# Patient Record
Sex: FEMALE | Race: WHITE | NOT HISPANIC OR LATINO | Employment: UNEMPLOYED | URBAN - METROPOLITAN AREA
[De-identification: names, ages, dates, MRNs, and addresses within clinical notes are randomized per-mention and may not be internally consistent; named-entity substitution may affect disease eponyms.]

---

## 2018-11-03 ENCOUNTER — HOSPITAL ENCOUNTER (OUTPATIENT)
Dept: LAB | Facility: MEDICAL CENTER | Age: 38
End: 2018-11-03
Attending: NURSE PRACTITIONER
Payer: MEDICAID

## 2018-11-03 LAB
APPEARANCE UR: CLEAR
BILIRUB UR QL STRIP.AUTO: NEGATIVE
COLOR UR: YELLOW
FSH SERPL-ACNC: 3.4 MIU/ML
GLUCOSE UR STRIP.AUTO-MCNC: NEGATIVE MG/DL
HIV 1+2 AB+HIV1 P24 AG SERPL QL IA: NON REACTIVE
KETONES UR STRIP.AUTO-MCNC: NEGATIVE MG/DL
LEUKOCYTE ESTERASE UR QL STRIP.AUTO: NEGATIVE
LH SERPL-ACNC: 1 IU/L
MICRO URNS: NORMAL
NITRITE UR QL STRIP.AUTO: NEGATIVE
PH UR STRIP.AUTO: 6.5 [PH]
PROLACTIN SERPL-MCNC: 9.47 NG/ML (ref 2.8–26)
PROT UR QL STRIP: NEGATIVE MG/DL
RBC UR QL AUTO: NEGATIVE
SP GR UR STRIP.AUTO: 1.02
T4 FREE SERPL-MCNC: 1.02 NG/DL (ref 0.53–1.43)
TREPONEMA PALLIDUM IGG+IGM AB [PRESENCE] IN SERUM OR PLASMA BY IMMUNOASSAY: NON REACTIVE
TSH SERPL DL<=0.005 MIU/L-ACNC: 1.77 UIU/ML (ref 0.38–5.33)
UROBILINOGEN UR STRIP.AUTO-MCNC: 0.2 MG/DL

## 2018-11-03 PROCEDURE — 81003 URINALYSIS AUTO W/O SCOPE: CPT

## 2018-11-03 PROCEDURE — 84443 ASSAY THYROID STIM HORMONE: CPT

## 2018-11-03 PROCEDURE — 83001 ASSAY OF GONADOTROPIN (FSH): CPT

## 2018-11-03 PROCEDURE — 36415 COLL VENOUS BLD VENIPUNCTURE: CPT

## 2018-11-03 PROCEDURE — 82671 ASSAY OF ESTROGENS: CPT

## 2018-11-03 PROCEDURE — 87389 HIV-1 AG W/HIV-1&-2 AB AG IA: CPT

## 2018-11-03 PROCEDURE — 83002 ASSAY OF GONADOTROPIN (LH): CPT

## 2018-11-03 PROCEDURE — 84146 ASSAY OF PROLACTIN: CPT

## 2018-11-03 PROCEDURE — 86780 TREPONEMA PALLIDUM: CPT

## 2018-11-03 PROCEDURE — 84439 ASSAY OF FREE THYROXINE: CPT

## 2018-11-07 LAB
ESTRADIOL SERPL HS-MCNC: 165 PG/ML
ESTROGEN SERPL CALC-MCNC: 239 PG/ML
ESTRONE SERPL-MCNC: 74 PG/ML

## 2019-03-12 ENCOUNTER — HOSPITAL ENCOUNTER (OUTPATIENT)
Dept: LAB | Facility: MEDICAL CENTER | Age: 39
End: 2019-03-12
Attending: INTERNAL MEDICINE
Payer: MEDICAID

## 2019-03-12 LAB
ALBUMIN SERPL BCP-MCNC: 4.5 G/DL (ref 3.2–4.9)
ALBUMIN/GLOB SERPL: 1.9 G/DL
ALP SERPL-CCNC: 37 U/L (ref 30–99)
ALT SERPL-CCNC: 20 U/L (ref 2–50)
ANION GAP SERPL CALC-SCNC: 5 MMOL/L (ref 0–11.9)
AST SERPL-CCNC: 24 U/L (ref 12–45)
BASOPHILS # BLD AUTO: 1.7 % (ref 0–1.8)
BASOPHILS # BLD: 0.1 K/UL (ref 0–0.12)
BILIRUB SERPL-MCNC: 1.9 MG/DL (ref 0.1–1.5)
BUN SERPL-MCNC: 8 MG/DL (ref 8–22)
CALCIUM SERPL-MCNC: 9.4 MG/DL (ref 8.5–10.5)
CHLORIDE SERPL-SCNC: 109 MMOL/L (ref 96–112)
CO2 SERPL-SCNC: 25 MMOL/L (ref 20–33)
CREAT SERPL-MCNC: 0.79 MG/DL (ref 0.5–1.4)
EOSINOPHIL # BLD AUTO: 0.2 K/UL (ref 0–0.51)
EOSINOPHIL NFR BLD: 3.5 % (ref 0–6.9)
ERYTHROCYTE [DISTWIDTH] IN BLOOD BY AUTOMATED COUNT: 44.9 FL (ref 35.9–50)
GLOBULIN SER CALC-MCNC: 2.4 G/DL (ref 1.9–3.5)
GLUCOSE SERPL-MCNC: 88 MG/DL (ref 65–99)
HCT VFR BLD AUTO: 45.1 % (ref 37–47)
HGB BLD-MCNC: 14.8 G/DL (ref 12–16)
INR PPP: 1.09 (ref 0.87–1.13)
IRON SATN MFR SERPL: 73 % (ref 15–55)
IRON SERPL-MCNC: 226 UG/DL (ref 40–170)
LYMPHOCYTES # BLD AUTO: 1.73 K/UL (ref 1–4.8)
LYMPHOCYTES NFR BLD: 30.4 % (ref 22–41)
MAGNESIUM SERPL-MCNC: 2.1 MG/DL (ref 1.5–2.5)
MANUAL DIFF BLD: NORMAL
MCH RBC QN AUTO: 31.5 PG (ref 27–33)
MCHC RBC AUTO-ENTMCNC: 32.8 G/DL (ref 33.6–35)
MCV RBC AUTO: 96 FL (ref 81.4–97.8)
MONOCYTES # BLD AUTO: 0.35 K/UL (ref 0–0.85)
MONOCYTES NFR BLD AUTO: 6.1 % (ref 0–13.4)
NEUTROPHILS # BLD AUTO: 3.32 K/UL (ref 2–7.15)
NEUTROPHILS NFR BLD: 58.3 % (ref 44–72)
PHOSPHATE SERPL-MCNC: 3.4 MG/DL (ref 2.5–4.5)
PLATELET # BLD AUTO: 240 K/UL (ref 164–446)
PMV BLD AUTO: 9.6 FL (ref 9–12.9)
POTASSIUM SERPL-SCNC: 4.1 MMOL/L (ref 3.6–5.5)
PROT SERPL-MCNC: 6.9 G/DL (ref 6–8.2)
PROTHROMBIN TIME: 14.2 SEC (ref 12–14.6)
RBC # BLD AUTO: 4.7 M/UL (ref 4.2–5.4)
SODIUM SERPL-SCNC: 139 MMOL/L (ref 135–145)
TIBC SERPL-MCNC: 309 UG/DL (ref 250–450)
WBC # BLD AUTO: 5.7 K/UL (ref 4.8–10.8)

## 2019-03-12 PROCEDURE — 85610 PROTHROMBIN TIME: CPT

## 2019-03-12 PROCEDURE — 84443 ASSAY THYROID STIM HORMONE: CPT

## 2019-03-12 PROCEDURE — 80053 COMPREHEN METABOLIC PANEL: CPT

## 2019-03-12 PROCEDURE — 85007 BL SMEAR W/DIFF WBC COUNT: CPT

## 2019-03-12 PROCEDURE — 83735 ASSAY OF MAGNESIUM: CPT

## 2019-03-12 PROCEDURE — 83540 ASSAY OF IRON: CPT

## 2019-03-12 PROCEDURE — 85027 COMPLETE CBC AUTOMATED: CPT

## 2019-03-12 PROCEDURE — 36415 COLL VENOUS BLD VENIPUNCTURE: CPT

## 2019-03-12 PROCEDURE — 84100 ASSAY OF PHOSPHORUS: CPT

## 2019-03-12 PROCEDURE — 83550 IRON BINDING TEST: CPT

## 2019-03-19 LAB — TEST NAME 95000: NORMAL

## 2019-06-06 ENCOUNTER — HOSPITAL ENCOUNTER (OUTPATIENT)
Dept: LAB | Facility: MEDICAL CENTER | Age: 39
End: 2019-06-06
Attending: INTERNAL MEDICINE
Payer: MEDICAID

## 2019-06-06 PROCEDURE — 82274 ASSAY TEST FOR BLOOD FECAL: CPT

## 2019-06-09 LAB — HEMOCCULT STL QL IA: NEGATIVE

## 2020-01-23 ENCOUNTER — HOSPITAL ENCOUNTER (OUTPATIENT)
Dept: LAB | Facility: MEDICAL CENTER | Age: 40
End: 2020-01-23
Attending: STUDENT IN AN ORGANIZED HEALTH CARE EDUCATION/TRAINING PROGRAM
Payer: MEDICAID

## 2020-01-23 LAB
ALBUMIN SERPL BCP-MCNC: 4.7 G/DL (ref 3.2–4.9)
ALBUMIN/GLOB SERPL: 1.5 G/DL
ALP SERPL-CCNC: 48 U/L (ref 30–99)
ALT SERPL-CCNC: 22 U/L (ref 2–50)
ANION GAP SERPL CALC-SCNC: 9 MMOL/L (ref 0–11.9)
APPEARANCE UR: CLEAR
AST SERPL-CCNC: 25 U/L (ref 12–45)
BASOPHILS # BLD AUTO: 1.3 % (ref 0–1.8)
BASOPHILS # BLD: 0.06 K/UL (ref 0–0.12)
BILIRUB SERPL-MCNC: 2 MG/DL (ref 0.1–1.5)
BILIRUB UR QL STRIP.AUTO: NEGATIVE
BUN SERPL-MCNC: 11 MG/DL (ref 8–22)
CALCIUM SERPL-MCNC: 9.8 MG/DL (ref 8.5–10.5)
CHLORIDE SERPL-SCNC: 105 MMOL/L (ref 96–112)
CO2 SERPL-SCNC: 25 MMOL/L (ref 20–33)
COLOR UR: YELLOW
CREAT SERPL-MCNC: 0.91 MG/DL (ref 0.5–1.4)
CRP SERPL HS-MCNC: 0.04 MG/DL (ref 0–0.75)
EOSINOPHIL # BLD AUTO: 0.17 K/UL (ref 0–0.51)
EOSINOPHIL NFR BLD: 3.6 % (ref 0–6.9)
ERYTHROCYTE [DISTWIDTH] IN BLOOD BY AUTOMATED COUNT: 43.6 FL (ref 35.9–50)
ERYTHROCYTE [SEDIMENTATION RATE] IN BLOOD BY WESTERGREN METHOD: 3 MM/HOUR (ref 0–20)
GLOBULIN SER CALC-MCNC: 3.1 G/DL (ref 1.9–3.5)
GLUCOSE SERPL-MCNC: 90 MG/DL (ref 65–99)
GLUCOSE UR STRIP.AUTO-MCNC: NEGATIVE MG/DL
HCT VFR BLD AUTO: 48.1 % (ref 37–47)
HGB BLD-MCNC: 16 G/DL (ref 12–16)
IMM GRANULOCYTES # BLD AUTO: 0.01 K/UL (ref 0–0.11)
IMM GRANULOCYTES NFR BLD AUTO: 0.2 % (ref 0–0.9)
KETONES UR STRIP.AUTO-MCNC: NEGATIVE MG/DL
LEUKOCYTE ESTERASE UR QL STRIP.AUTO: NEGATIVE
LYMPHOCYTES # BLD AUTO: 1.85 K/UL (ref 1–4.8)
LYMPHOCYTES NFR BLD: 39.3 % (ref 22–41)
MCH RBC QN AUTO: 31.7 PG (ref 27–33)
MCHC RBC AUTO-ENTMCNC: 33.3 G/DL (ref 33.6–35)
MCV RBC AUTO: 95.2 FL (ref 81.4–97.8)
MICRO URNS: NORMAL
MONOCYTES # BLD AUTO: 0.41 K/UL (ref 0–0.85)
MONOCYTES NFR BLD AUTO: 8.7 % (ref 0–13.4)
NEUTROPHILS # BLD AUTO: 2.21 K/UL (ref 2–7.15)
NEUTROPHILS NFR BLD: 46.9 % (ref 44–72)
NITRITE UR QL STRIP.AUTO: NEGATIVE
NRBC # BLD AUTO: 0 K/UL
NRBC BLD-RTO: 0 /100 WBC
PH UR STRIP.AUTO: 7 [PH] (ref 5–8)
PLATELET # BLD AUTO: 264 K/UL (ref 164–446)
PMV BLD AUTO: 9.8 FL (ref 9–12.9)
POTASSIUM SERPL-SCNC: 3.9 MMOL/L (ref 3.6–5.5)
PROT SERPL-MCNC: 7.8 G/DL (ref 6–8.2)
PROT UR QL STRIP: NEGATIVE MG/DL
RBC # BLD AUTO: 5.05 M/UL (ref 4.2–5.4)
RBC UR QL AUTO: NEGATIVE
RHEUMATOID FACT SER IA-ACNC: <10 IU/ML (ref 0–14)
SODIUM SERPL-SCNC: 139 MMOL/L (ref 135–145)
SP GR UR STRIP.AUTO: 1.01
TSH SERPL DL<=0.005 MIU/L-ACNC: 1.62 UIU/ML (ref 0.38–5.33)
UROBILINOGEN UR STRIP.AUTO-MCNC: 0.2 MG/DL
WBC # BLD AUTO: 4.7 K/UL (ref 4.8–10.8)

## 2020-01-23 PROCEDURE — 84443 ASSAY THYROID STIM HORMONE: CPT

## 2020-01-23 PROCEDURE — 86038 ANTINUCLEAR ANTIBODIES: CPT

## 2020-01-23 PROCEDURE — 85025 COMPLETE CBC W/AUTO DIFF WBC: CPT

## 2020-01-23 PROCEDURE — 86431 RHEUMATOID FACTOR QUANT: CPT

## 2020-01-23 PROCEDURE — 87086 URINE CULTURE/COLONY COUNT: CPT

## 2020-01-23 PROCEDURE — 80053 COMPREHEN METABOLIC PANEL: CPT

## 2020-01-23 PROCEDURE — 81003 URINALYSIS AUTO W/O SCOPE: CPT

## 2020-01-23 PROCEDURE — 86235 NUCLEAR ANTIGEN ANTIBODY: CPT

## 2020-01-23 PROCEDURE — 36415 COLL VENOUS BLD VENIPUNCTURE: CPT

## 2020-01-23 PROCEDURE — 86140 C-REACTIVE PROTEIN: CPT

## 2020-01-23 PROCEDURE — 86225 DNA ANTIBODY NATIVE: CPT

## 2020-01-23 PROCEDURE — 86200 CCP ANTIBODY: CPT

## 2020-01-23 PROCEDURE — 85652 RBC SED RATE AUTOMATED: CPT

## 2020-01-25 LAB
BACTERIA UR CULT: NORMAL
CCP IGG SERPL-ACNC: 4 UNITS (ref 0–19)
DSDNA AB TITR SER CLIF: NORMAL {TITER}
ENA SCL70 IGG SER QL: 1 AU/ML (ref 0–40)
NUCLEAR IGG SER QL IA: NORMAL
SIGNIFICANT IND 70042: NORMAL
SITE SITE: NORMAL
SOURCE SOURCE: NORMAL

## 2020-02-20 ENCOUNTER — TELEPHONE (OUTPATIENT)
Dept: CARDIOLOGY | Facility: MEDICAL CENTER | Age: 40
End: 2020-02-20

## 2020-02-20 ENCOUNTER — NON-PROVIDER VISIT (OUTPATIENT)
Dept: CARDIOLOGY | Facility: MEDICAL CENTER | Age: 40
End: 2020-02-20
Payer: MEDICAID

## 2020-02-20 DIAGNOSIS — R00.2 PALPITATIONS: ICD-10-CM

## 2020-02-20 PROCEDURE — 93268 ECG RECORD/REVIEW: CPT | Performed by: INTERNAL MEDICINE

## 2020-02-20 NOTE — TELEPHONE ENCOUNTER
Patient enrolled in the 3 day [employer] [facility] Heart monitoring program per Carlitos Tucker M.D. King Chanel ADD today.  >In office hookup with Baseline transmitted.  >Pending EOS.

## 2020-02-25 ENCOUNTER — TELEPHONE (OUTPATIENT)
Dept: CARDIOLOGY | Facility: MEDICAL CENTER | Age: 40
End: 2020-02-25

## 2020-02-25 NOTE — TELEPHONE ENCOUNTER
Result Notes for Holter Monitor / Event Recorder     Notes recorded by NAVNEET Sethi on 2/25/2020 at 2:09 PM PST  Good holter, no concerns at all. Reassure patient. SC     ---------------------------------------------------------------------------------------------------------------------------------------    Attempted to reach patient.  Call when directly to voicemail.  Left message requesting return phone call.

## 2021-06-15 ENCOUNTER — HOSPITAL ENCOUNTER (EMERGENCY)
Facility: MEDICAL CENTER | Age: 41
End: 2021-06-16
Attending: EMERGENCY MEDICINE
Payer: MEDICAID

## 2021-06-15 DIAGNOSIS — M79.641 PAIN IN BOTH HANDS: ICD-10-CM

## 2021-06-15 DIAGNOSIS — M79.671 PAIN IN BOTH FEET: ICD-10-CM

## 2021-06-15 DIAGNOSIS — R10.13 EPIGASTRIC ABDOMINAL PAIN: ICD-10-CM

## 2021-06-15 DIAGNOSIS — M79.642 PAIN IN BOTH HANDS: ICD-10-CM

## 2021-06-15 DIAGNOSIS — M79.672 PAIN IN BOTH FEET: ICD-10-CM

## 2021-06-15 PROCEDURE — 99285 EMERGENCY DEPT VISIT HI MDM: CPT

## 2021-06-15 ASSESSMENT — FIBROSIS 4 INDEX: FIB4 SCORE: 0.85

## 2021-06-16 VITALS
WEIGHT: 102.29 LBS | RESPIRATION RATE: 18 BRPM | BODY MASS INDEX: 20.62 KG/M2 | TEMPERATURE: 96.9 F | OXYGEN SATURATION: 94 % | HEIGHT: 59 IN | HEART RATE: 56 BPM | DIASTOLIC BLOOD PRESSURE: 57 MMHG | SYSTOLIC BLOOD PRESSURE: 111 MMHG

## 2021-06-16 LAB
ALBUMIN SERPL BCP-MCNC: 4.5 G/DL (ref 3.2–4.9)
ALBUMIN/GLOB SERPL: 1.6 G/DL
ALP SERPL-CCNC: 54 U/L (ref 30–99)
ALT SERPL-CCNC: 21 U/L (ref 2–50)
AMPHET UR QL SCN: NEGATIVE
ANION GAP SERPL CALC-SCNC: 12 MMOL/L (ref 7–16)
APPEARANCE UR: CLEAR
AST SERPL-CCNC: 23 U/L (ref 12–45)
BARBITURATES UR QL SCN: NEGATIVE
BASOPHILS # BLD AUTO: 0.8 % (ref 0–1.8)
BASOPHILS # BLD: 0.06 K/UL (ref 0–0.12)
BENZODIAZ UR QL SCN: NEGATIVE
BILIRUB SERPL-MCNC: 2.5 MG/DL (ref 0.1–1.5)
BILIRUB UR QL STRIP.AUTO: NEGATIVE
BUN SERPL-MCNC: 9 MG/DL (ref 8–22)
BZE UR QL SCN: NEGATIVE
CALCIUM SERPL-MCNC: 9.5 MG/DL (ref 8.5–10.5)
CANNABINOIDS UR QL SCN: POSITIVE
CHLORIDE SERPL-SCNC: 105 MMOL/L (ref 96–112)
CO2 SERPL-SCNC: 21 MMOL/L (ref 20–33)
COLOR UR: YELLOW
CREAT SERPL-MCNC: 0.68 MG/DL (ref 0.5–1.4)
EKG IMPRESSION: NORMAL
EOSINOPHIL # BLD AUTO: 0.16 K/UL (ref 0–0.51)
EOSINOPHIL NFR BLD: 2.1 % (ref 0–6.9)
ERYTHROCYTE [DISTWIDTH] IN BLOOD BY AUTOMATED COUNT: 42.3 FL (ref 35.9–50)
ETHANOL BLD-MCNC: <10.1 MG/DL (ref 0–10)
GLOBULIN SER CALC-MCNC: 2.9 G/DL (ref 1.9–3.5)
GLUCOSE SERPL-MCNC: 91 MG/DL (ref 65–99)
GLUCOSE UR STRIP.AUTO-MCNC: NEGATIVE MG/DL
HCG SERPL QL: NEGATIVE
HCT VFR BLD AUTO: 42.8 % (ref 37–47)
HGB BLD-MCNC: 14.3 G/DL (ref 12–16)
IMM GRANULOCYTES # BLD AUTO: 0.03 K/UL (ref 0–0.11)
IMM GRANULOCYTES NFR BLD AUTO: 0.4 % (ref 0–0.9)
KETONES UR STRIP.AUTO-MCNC: 15 MG/DL
LEUKOCYTE ESTERASE UR QL STRIP.AUTO: NEGATIVE
LIPASE SERPL-CCNC: 26 U/L (ref 11–82)
LYMPHOCYTES # BLD AUTO: 3.04 K/UL (ref 1–4.8)
LYMPHOCYTES NFR BLD: 40.5 % (ref 22–41)
MCH RBC QN AUTO: 31.2 PG (ref 27–33)
MCHC RBC AUTO-ENTMCNC: 33.4 G/DL (ref 33.6–35)
MCV RBC AUTO: 93.4 FL (ref 81.4–97.8)
METHADONE UR QL SCN: NEGATIVE
MICRO URNS: ABNORMAL
MONOCYTES # BLD AUTO: 0.63 K/UL (ref 0–0.85)
MONOCYTES NFR BLD AUTO: 8.4 % (ref 0–13.4)
NEUTROPHILS # BLD AUTO: 3.58 K/UL (ref 2–7.15)
NEUTROPHILS NFR BLD: 47.8 % (ref 44–72)
NITRITE UR QL STRIP.AUTO: NEGATIVE
NRBC # BLD AUTO: 0 K/UL
NRBC BLD-RTO: 0 /100 WBC
OPIATES UR QL SCN: NEGATIVE
OXYCODONE UR QL SCN: NEGATIVE
PCP UR QL SCN: NEGATIVE
PH UR STRIP.AUTO: 6 [PH] (ref 5–8)
PLATELET # BLD AUTO: 236 K/UL (ref 164–446)
PMV BLD AUTO: 9.8 FL (ref 9–12.9)
POTASSIUM SERPL-SCNC: 3.8 MMOL/L (ref 3.6–5.5)
PROPOXYPH UR QL SCN: NEGATIVE
PROT SERPL-MCNC: 7.4 G/DL (ref 6–8.2)
PROT UR QL STRIP: NEGATIVE MG/DL
RBC # BLD AUTO: 4.58 M/UL (ref 4.2–5.4)
RBC UR QL AUTO: NEGATIVE
SODIUM SERPL-SCNC: 138 MMOL/L (ref 135–145)
SP GR UR STRIP.AUTO: 1.02
TROPONIN T SERPL-MCNC: <6 NG/L (ref 6–19)
TSH SERPL DL<=0.005 MIU/L-ACNC: 2.76 UIU/ML (ref 0.38–5.33)
UROBILINOGEN UR STRIP.AUTO-MCNC: 0.2 MG/DL
WBC # BLD AUTO: 7.5 K/UL (ref 4.8–10.8)

## 2021-06-16 PROCEDURE — 81003 URINALYSIS AUTO W/O SCOPE: CPT

## 2021-06-16 PROCEDURE — 80053 COMPREHEN METABOLIC PANEL: CPT

## 2021-06-16 PROCEDURE — 83690 ASSAY OF LIPASE: CPT

## 2021-06-16 PROCEDURE — A9270 NON-COVERED ITEM OR SERVICE: HCPCS | Performed by: EMERGENCY MEDICINE

## 2021-06-16 PROCEDURE — 84703 CHORIONIC GONADOTROPIN ASSAY: CPT

## 2021-06-16 PROCEDURE — 93005 ELECTROCARDIOGRAM TRACING: CPT | Performed by: EMERGENCY MEDICINE

## 2021-06-16 PROCEDURE — 96374 THER/PROPH/DIAG INJ IV PUSH: CPT

## 2021-06-16 PROCEDURE — 96375 TX/PRO/DX INJ NEW DRUG ADDON: CPT

## 2021-06-16 PROCEDURE — 84443 ASSAY THYROID STIM HORMONE: CPT

## 2021-06-16 PROCEDURE — 700111 HCHG RX REV CODE 636 W/ 250 OVERRIDE (IP): Performed by: EMERGENCY MEDICINE

## 2021-06-16 PROCEDURE — 80307 DRUG TEST PRSMV CHEM ANLYZR: CPT

## 2021-06-16 PROCEDURE — 82077 ASSAY SPEC XCP UR&BREATH IA: CPT

## 2021-06-16 PROCEDURE — 85025 COMPLETE CBC W/AUTO DIFF WBC: CPT

## 2021-06-16 PROCEDURE — 700102 HCHG RX REV CODE 250 W/ 637 OVERRIDE(OP): Performed by: EMERGENCY MEDICINE

## 2021-06-16 PROCEDURE — 84484 ASSAY OF TROPONIN QUANT: CPT

## 2021-06-16 RX ORDER — ONDANSETRON 2 MG/ML
4 INJECTION INTRAMUSCULAR; INTRAVENOUS ONCE
Status: COMPLETED | OUTPATIENT
Start: 2021-06-16 | End: 2021-06-16

## 2021-06-16 RX ORDER — OMEPRAZOLE 20 MG/1
20 CAPSULE, DELAYED RELEASE ORAL ONCE
Status: COMPLETED | OUTPATIENT
Start: 2021-06-16 | End: 2021-06-16

## 2021-06-16 RX ORDER — LORAZEPAM 2 MG/ML
0.5 INJECTION INTRAMUSCULAR ONCE
Status: COMPLETED | OUTPATIENT
Start: 2021-06-16 | End: 2021-06-16

## 2021-06-16 RX ADMIN — LORAZEPAM 0.5 MG: 2 INJECTION INTRAMUSCULAR; INTRAVENOUS at 02:13

## 2021-06-16 RX ADMIN — OMEPRAZOLE 20 MG: 20 CAPSULE, DELAYED RELEASE ORAL at 02:13

## 2021-06-16 RX ADMIN — ONDANSETRON 4 MG: 2 INJECTION INTRAMUSCULAR; INTRAVENOUS at 02:13

## 2021-06-16 RX ADMIN — LIDOCAINE HYDROCHLORIDE 30 ML: 20 SOLUTION OROPHARYNGEAL at 02:13

## 2021-06-16 ASSESSMENT — LIFESTYLE VARIABLES
DOES PATIENT WANT TO STOP DRINKING: NO
DO YOU DRINK ALCOHOL: NO

## 2021-06-16 NOTE — ED PROVIDER NOTES
ED PROVIDER NOTE    Scribed for Teresa Bernal M.D. by Ab Tolbert. 6/16/2021, 3:48 AM.    This is an addendum to the note on Lisa Jones. For further details and full chart entry, see the previously signed ED Provider Note written by Dr. Linton (ERP).      3:48 AM - I discussed the patient's case with Dr. Linton (ERP) who will transfer care of the patient to me at this time. Plan to follow-up on patient's TSH levels.     4:22 AM - TSH levels were normal, discussed with patient. Patient will be discharged home.        FINAL IMPRESSION   1. Epigastric abdominal pain    2. Pain in both hands    3. Pain in both feet        I, Ab Tolbert (Scribe), am scribing for, and in the presence of, DIXIE Beckman*.    Electronically signed by: Ab Tolbert (Scribe), 6/16/2021    IXiang M.* personally performed the services described in this documentation, as scribed by Ab Tolbert in my presence, and it is both accurate and complete.    The note accurately reflects work and decisions made by me.  Teresa Bernal M.D.  6/16/2021  7:36 AM

## 2021-06-16 NOTE — ED PROVIDER NOTES
ED Provider Note    Scribed for Xiang Linton M.D. by Du Cisse. 2021, 12:26 AM.    Primary care provider: Ventura Sheikh D.O.  Means of arrival: walk in  History obtained from: patient  History limited by: none    CHIEF COMPLAINT  Chief Complaint   Patient presents with   • Cramping     all over body, x2 weeks since blood draw at [facility]   • Numbness     x5 years to hands and feet       HPI  Lisa Jones is a 41 y.o. female who presents to the Emergency Department for intermittent numbness in her bilateral hands and feet for the past 5 years. She describes the feeling as tingling and painful, and notes her hands/feet sometimes become purple. They do not become white or red, per the patient. She also notes that she passes out 3-4 times per day when she experiences the tingling, usually while she is at rest. She additionally complains of waxing and waning abdominal pain behind her rib cage since . It does not radiate anywhere. She has been having nausea for the past month. She denies any vomiting. The patient states she has been having urinary retention and has not had a bowel movement in 3-4 days.    REVIEW OF SYSTEMS  Pertinent positives include numbness and tingling in bilateral hands and feet, syncope, abdominal pain, nausea, constipation, urinary retention. Pertinent negatives include no vomiting. All other systems negative.    PAST MEDICAL HISTORY   has a past medical history of ASTHMA (2011), Hypertension, Thyroid disease (), and Ulcer.    SURGICAL HISTORY   has a past surgical history that includes primary c section (2012).    SOCIAL HISTORY  Social History     Tobacco Use   • Smoking status: Former Smoker     Packs/day: 0.35     Types: Cigarettes     Quit date: 2011     Years since quittin.8   • Smokeless tobacco: Never Used   • Tobacco comment: quit    Vaping Use   • Vaping Use: Never assessed   Substance Use Topics   • Alcohol use: Yes      "Comment: occasional   • Drug use: No     Types: Marijuana     Comment: marijuana last used 9/2011, meth pt states last used 1yr ago (in 2010)      Social History     Substance and Sexual Activity   Drug Use No   • Types: Marijuana    Comment: marijuana last used 9/2011, meth pt states last used 1yr ago (in 2010)       FAMILY HISTORY  Family History   Problem Relation Age of Onset   • Psychiatric Illness Sister         schetzophrenic   • Arthritis Mother    • Arthritis Maternal Grandmother    • Heart Disease Maternal Grandfather    • Arthritis Paternal Grandmother        CURRENT MEDICATIONS  Home Medications    **Home medications have not yet been reviewed for this encounter**         ALLERGIES  Allergies   Allergen Reactions   • Erythromycin      \"Burning sensation in chest\"       PHYSICAL EXAM  VITAL SIGNS: /81   Pulse 70   Temp 35.8 °C (96.5 °F) (Temporal)   Resp 16   Ht 1.499 m (4' 11\")   Wt 46.4 kg (102 lb 4.7 oz)   SpO2 99%   BMI 20.66 kg/m²     Constitutional: Well developed, Well nourished, mild distress.   HENT: Normocephalic, Atraumatic, mask in place.  Eyes: Conjunctiva normal, No discharge.   Neck: Supple, No stridor   Cardiovascular: Normal heart rate, Normal rhythm, No murmurs, equal pulses, normal capillary refill 2 seconds bilaterally.   Pulmonary: Normal breath sounds, No respiratory distress, No wheezing, No rales, No rhonchi.  Chest: No chest wall tenderness or deformity.   Abdomen: Soft, Slight epigastric tenderness, No masses, no rebound, no guarding.   Back: No CVA tenderness.   Musculoskeletal: No major deformities noted  Skin: Hands red, normal capillary refill 2 seconds bilaterally. Warm, Dry, No rash.   Neurologic: Alert & oriented x 3, Normal motor function,  No focal deficits noted.   Psychiatric: Tangential, very pressured speech, significant preoccupation with several medical complaints    LABS  Results for orders placed or performed during the hospital encounter of 06/15/21 "   HCG QUAL SERUM   Result Value Ref Range    Beta-Hcg Qualitative Serum Negative Negative   CBC WITH DIFFERENTIAL   Result Value Ref Range    WBC 7.5 4.8 - 10.8 K/uL    RBC 4.58 4.20 - 5.40 M/uL    Hemoglobin 14.3 12.0 - 16.0 g/dL    Hematocrit 42.8 37.0 - 47.0 %    MCV 93.4 81.4 - 97.8 fL    MCH 31.2 27.0 - 33.0 pg    MCHC 33.4 (L) 33.6 - 35.0 g/dL    RDW 42.3 35.9 - 50.0 fL    Platelet Count 236 164 - 446 K/uL    MPV 9.8 9.0 - 12.9 fL    Neutrophils-Polys 47.80 44.00 - 72.00 %    Lymphocytes 40.50 22.00 - 41.00 %    Monocytes 8.40 0.00 - 13.40 %    Eosinophils 2.10 0.00 - 6.90 %    Basophils 0.80 0.00 - 1.80 %    Immature Granulocytes 0.40 0.00 - 0.90 %    Nucleated RBC 0.00 /100 WBC    Neutrophils (Absolute) 3.58 2.00 - 7.15 K/uL    Lymphs (Absolute) 3.04 1.00 - 4.80 K/uL    Monos (Absolute) 0.63 0.00 - 0.85 K/uL    Eos (Absolute) 0.16 0.00 - 0.51 K/uL    Baso (Absolute) 0.06 0.00 - 0.12 K/uL    Immature Granulocytes (abs) 0.03 0.00 - 0.11 K/uL    NRBC (Absolute) 0.00 K/uL   COMP METABOLIC PANEL   Result Value Ref Range    Sodium 138 135 - 145 mmol/L    Potassium 3.8 3.6 - 5.5 mmol/L    Chloride 105 96 - 112 mmol/L    Co2 21 20 - 33 mmol/L    Anion Gap 12.0 7.0 - 16.0    Glucose 91 65 - 99 mg/dL    Bun 9 8 - 22 mg/dL    Creatinine 0.68 0.50 - 1.40 mg/dL    Calcium 9.5 8.5 - 10.5 mg/dL    AST(SGOT) 23 12 - 45 U/L    ALT(SGPT) 21 2 - 50 U/L    Alkaline Phosphatase 54 30 - 99 U/L    Total Bilirubin 2.5 (H) 0.1 - 1.5 mg/dL    Albumin 4.5 3.2 - 4.9 g/dL    Total Protein 7.4 6.0 - 8.2 g/dL    Globulin 2.9 1.9 - 3.5 g/dL    A-G Ratio 1.6 g/dL   LIPASE   Result Value Ref Range    Lipase 26 11 - 82 U/L   TROPONIN   Result Value Ref Range    Troponin T <6 6 - 19 ng/L   DIAGNOSTIC ALCOHOL   Result Value Ref Range    Diagnostic Alcohol <10.1 0.0 - 10.0 mg/dL   URINE DRUG SCREEN   Result Value Ref Range    Amphetamines Urine Negative Negative    Barbiturates Negative Negative    Benzodiazepines Negative Negative    Cocaine  Metabolite Negative Negative    Methadone Negative Negative    Opiates Negative Negative    Oxycodone Negative Negative    Phencyclidine -Pcp Negative Negative    Propoxyphene Negative Negative    Cannabinoid Metab Positive (A) Negative   URINALYSIS (UA)    Specimen: Urine   Result Value Ref Range    Color Yellow     Character Clear     Specific Gravity 1.025 <1.035    Ph 6.0 5.0 - 8.0    Glucose Negative Negative mg/dL    Ketones 15 (A) Negative mg/dL    Protein Negative Negative mg/dL    Bilirubin Negative Negative    Urobilinogen, Urine 0.2 Negative    Nitrite Negative Negative    Leukocyte Esterase Negative Negative    Occult Blood Negative Negative    Micro Urine Req see below    ESTIMATED GFR   Result Value Ref Range    GFR If African American >60 >60 mL/min/1.73 m 2    GFR If Non African American >60 >60 mL/min/1.73 m 2   EKG (NOW)   Result Value Ref Range    Report       [facility]    Test Date:  2021  Pt Name:    ELLIOTT MEDRANO             Department: ER  MRN:        [id number]                      Room:       [facility]  Gender:     Female                       Technician: ABILIO  :        1980                   Requested By:ALEN CARTWRIGHT  Order #:    [id number]                    Reading MD: ALEN CARTWRIGHT MD    Measurements  Intervals                                Axis  Rate:       56                           P:          39  NM:         218                          QRS:        62  QRSD:       87                           T:          66  QT:         438  QTc:        425    Interpretive Statements  Sinus bradycardia, rate of 56, normal axis, no ST elevation, no T wave  inversions  Compared to ECG 2013 14:37:48  First degree AV block now present  Sinus rhythm no longer present  Electronically Signed On 2021 3:31:57 PDT by ALEN CARTWRIGHT MD         All labs reviewed by me.    EKG  12 Lead EKG interpreted by me above.    RADIOLOGY  No  orders to display     The radiologist's interpretation of all radiological studies have been reviewed by me.    COURSE & MEDICAL DECISION MAKING  Pertinent Labs & Imaging studies reviewed. (See chart for details)    12:26 AM - Patient seen and examined at bedside. Patient will be treated with Ativan 0.5 mg, Zofran 4 mg, GI cocktail 30 mL, omeprazole 20 mg. Ordered UA, HCG qual serum, CBC w/, CMP, lipase, troponin, diagnostic alcohol, urine drug screen, EKG to evaluate her symptoms. The differential diagnoses include but are not limited to: Raynaud's syndrome, electrolyte abnormality, gastritis, pancreatitis, ulcer, hyperthyroidism.    He does appreciate 315 she is sleeping.  Discussed with her that all of her labs so far are normal.  ThatPatient's TSH is still pending.    Medical Decision Making:  she may have Raynaud's syndrome which cause discoloration the hands with numbness and tingling.  I will have her follow-up with her primary for further work-up of this.  Patient's abdominal pain has been chronic going on for several months her electrolytes are otherwise unremarkable she has no elevation in her LFTs and a normal white blood cell count.  She does not have a surgical abdomen I do not think a CT of the abdomen pelvis would be beneficial.  Patient does have pressured speech and somewhat tangential speech but does not appear to be acutely psychotic and able to take care of herself.  I do not think she meets criteria for legal 2000.       The patient will return for new or worsening symptoms and is stable at the time of discharge.    The patient is referred to a primary physician for blood pressure management, diabetic screening, and for all other preventative health concerns.        DISPOSITION:  Patient will be discharged home in stable condition.    FOLLOW UP:  Ventura Sheikh D.O.  [address]    [place] [address]  [phone]    Schedule an appointment as soon as possible for a visit in 2  days        OUTPATIENT MEDICATIONS:  New Prescriptions    No medications on file          FINAL IMPRESSION  1. Epigastric abdominal pain    2. Pain in both hands    3. Pain in both feet          Du CASE (Fausto), am scribing for, and in the presence of, Xiang Linton M.D.    Electronically signed by: Du Cisse (Fausto), 6/16/2021    IXiang M.D. personally performed the services described in this documentation, as scribed by Du Cisse in my presence, and it is both accurate and complete. C    The note accurately reflects work and decisions made by me.  Xiang Linton M.D.  6/16/2021  3:37 AM

## 2021-06-16 NOTE — ED TRIAGE NOTES
"Lisa Jones  Chief Complaint   Patient presents with   • Cramping     all over body, x2 weeks since blood draw at [facility]   • Numbness     x5 years to hands and feet     Pt ambulated to triage without difficulty. Unable to obtain good history from patient as she constantly changes the subject. Pt arrives for multiple complaints. Pt does have redness to fingers and toes. Pt is constantly reporting that she has a \"blood flow and absorption problem\".  Last took Seroquel >1 month ago. Denies drug use, AH/VH, SI or HI.     Patient informed of triage process and to inform staff of any changes/worsening symptoms. Pt verbalized understanding. Pt denies concerns. Pt back to waiting room.     /86   Pulse 96   Temp 36.6 °C (97.8 °F) (Temporal)   Resp 16   Ht 1.499 m (4' 11\")   Wt 46.4 kg (102 lb 4.7 oz)   SpO2 96%       "

## 2021-06-16 NOTE — ED NOTES
Rounded on pt, pt is alert and orientated, speaking in full sentences, responds to commands and answers appropriately.  Resp are even and unlabored.  No behavioral indicators of pain. Patient updated on wait status, answered questions, addressed needs, pt sitting in the waiting room

## 2021-06-20 ENCOUNTER — HOSPITAL ENCOUNTER (EMERGENCY)
Facility: MEDICAL CENTER | Age: 41
End: 2021-06-21
Attending: EMERGENCY MEDICINE
Payer: MEDICAID

## 2021-06-20 DIAGNOSIS — F30.10 MANIC BEHAVIOR (HCC): ICD-10-CM

## 2021-06-20 DIAGNOSIS — R00.2 PALPITATIONS: ICD-10-CM

## 2021-06-20 LAB
ANION GAP SERPL CALC-SCNC: 15 MMOL/L (ref 7–16)
BASOPHILS # BLD AUTO: 0.8 % (ref 0–1.8)
BASOPHILS # BLD: 0.06 K/UL (ref 0–0.12)
BUN SERPL-MCNC: 11 MG/DL (ref 8–22)
CALCIUM SERPL-MCNC: 9.7 MG/DL (ref 8.5–10.5)
CHLORIDE SERPL-SCNC: 105 MMOL/L (ref 96–112)
CO2 SERPL-SCNC: 20 MMOL/L (ref 20–33)
CREAT SERPL-MCNC: 0.85 MG/DL (ref 0.5–1.4)
EKG IMPRESSION: NORMAL
EOSINOPHIL # BLD AUTO: 0.14 K/UL (ref 0–0.51)
EOSINOPHIL NFR BLD: 1.9 % (ref 0–6.9)
ERYTHROCYTE [DISTWIDTH] IN BLOOD BY AUTOMATED COUNT: 43.1 FL (ref 35.9–50)
GLUCOSE SERPL-MCNC: 141 MG/DL (ref 65–99)
HCG SERPL QL: NEGATIVE
HCT VFR BLD AUTO: 45.1 % (ref 37–47)
HGB BLD-MCNC: 15.1 G/DL (ref 12–16)
IMM GRANULOCYTES # BLD AUTO: 0.02 K/UL (ref 0–0.11)
IMM GRANULOCYTES NFR BLD AUTO: 0.3 % (ref 0–0.9)
LYMPHOCYTES # BLD AUTO: 2.56 K/UL (ref 1–4.8)
LYMPHOCYTES NFR BLD: 35.2 % (ref 22–41)
MCH RBC QN AUTO: 31.4 PG (ref 27–33)
MCHC RBC AUTO-ENTMCNC: 33.5 G/DL (ref 33.6–35)
MCV RBC AUTO: 93.8 FL (ref 81.4–97.8)
MONOCYTES # BLD AUTO: 0.55 K/UL (ref 0–0.85)
MONOCYTES NFR BLD AUTO: 7.6 % (ref 0–13.4)
NEUTROPHILS # BLD AUTO: 3.94 K/UL (ref 2–7.15)
NEUTROPHILS NFR BLD: 54.2 % (ref 44–72)
NRBC # BLD AUTO: 0 K/UL
NRBC BLD-RTO: 0 /100 WBC
PLATELET # BLD AUTO: 254 K/UL (ref 164–446)
PMV BLD AUTO: 9.7 FL (ref 9–12.9)
POC BREATHALIZER: 0 PERCENT (ref 0–0.01)
POTASSIUM SERPL-SCNC: 3 MMOL/L (ref 3.6–5.5)
RBC # BLD AUTO: 4.81 M/UL (ref 4.2–5.4)
SODIUM SERPL-SCNC: 140 MMOL/L (ref 135–145)
WBC # BLD AUTO: 7.3 K/UL (ref 4.8–10.8)

## 2021-06-20 PROCEDURE — 84443 ASSAY THYROID STIM HORMONE: CPT

## 2021-06-20 PROCEDURE — 93005 ELECTROCARDIOGRAM TRACING: CPT | Performed by: EMERGENCY MEDICINE

## 2021-06-20 PROCEDURE — 85025 COMPLETE CBC W/AUTO DIFF WBC: CPT

## 2021-06-20 PROCEDURE — 84703 CHORIONIC GONADOTROPIN ASSAY: CPT

## 2021-06-20 PROCEDURE — 302970 POC BREATHALIZER: Performed by: EMERGENCY MEDICINE

## 2021-06-20 PROCEDURE — 96372 THER/PROPH/DIAG INJ SC/IM: CPT

## 2021-06-20 PROCEDURE — 99284 EMERGENCY DEPT VISIT MOD MDM: CPT

## 2021-06-20 PROCEDURE — 80048 BASIC METABOLIC PNL TOTAL CA: CPT

## 2021-06-20 ASSESSMENT — FIBROSIS 4 INDEX: FIB4 SCORE: 0.89

## 2021-06-21 VITALS
RESPIRATION RATE: 19 BRPM | HEART RATE: 85 BPM | BODY MASS INDEX: 20.76 KG/M2 | DIASTOLIC BLOOD PRESSURE: 66 MMHG | TEMPERATURE: 99 F | OXYGEN SATURATION: 98 % | SYSTOLIC BLOOD PRESSURE: 117 MMHG | WEIGHT: 102.95 LBS | HEIGHT: 59 IN

## 2021-06-21 LAB
AMPHET UR QL SCN: NEGATIVE
BARBITURATES UR QL SCN: NEGATIVE
BENZODIAZ UR QL SCN: NEGATIVE
BZE UR QL SCN: NEGATIVE
CANNABINOIDS UR QL SCN: POSITIVE
HCG UR QL: NEGATIVE
METHADONE UR QL SCN: NEGATIVE
OPIATES UR QL SCN: NEGATIVE
OXYCODONE UR QL SCN: NEGATIVE
PCP UR QL SCN: NEGATIVE
PROPOXYPH UR QL SCN: NEGATIVE
TSH SERPL DL<=0.005 MIU/L-ACNC: 1.59 UIU/ML (ref 0.38–5.33)

## 2021-06-21 PROCEDURE — 96372 THER/PROPH/DIAG INJ SC/IM: CPT

## 2021-06-21 PROCEDURE — 700111 HCHG RX REV CODE 636 W/ 250 OVERRIDE (IP): Performed by: EMERGENCY MEDICINE

## 2021-06-21 PROCEDURE — 90791 PSYCH DIAGNOSTIC EVALUATION: CPT

## 2021-06-21 PROCEDURE — 81025 URINE PREGNANCY TEST: CPT

## 2021-06-21 PROCEDURE — 80307 DRUG TEST PRSMV CHEM ANLYZR: CPT

## 2021-06-21 RX ORDER — POTASSIUM CHLORIDE 20 MEQ/1
40 TABLET, EXTENDED RELEASE ORAL ONCE
Status: DISCONTINUED | OUTPATIENT
Start: 2021-06-21 | End: 2021-06-21 | Stop reason: HOSPADM

## 2021-06-21 RX ORDER — LORAZEPAM 1 MG/1
1 TABLET ORAL ONCE
Status: DISCONTINUED | OUTPATIENT
Start: 2021-06-21 | End: 2021-06-21 | Stop reason: HOSPADM

## 2021-06-21 RX ORDER — ZIPRASIDONE MESYLATE 20 MG/ML
20 INJECTION, POWDER, LYOPHILIZED, FOR SOLUTION INTRAMUSCULAR
Status: COMPLETED | OUTPATIENT
Start: 2021-06-21 | End: 2021-06-21

## 2021-06-21 RX ADMIN — ZIPRASIDONE MESYLATE 20 MG: 20 INJECTION, POWDER, LYOPHILIZED, FOR SOLUTION INTRAMUSCULAR at 03:05

## 2021-06-21 NOTE — DISCHARGE PLANNING
Medical Social Work    GOLD received a call from Tustin with [facility] who states that their Psychiatrist would like pt's potassium corrected prior to accepting her.  MSW updated bedside RN.

## 2021-06-21 NOTE — ED TRIAGE NOTES
"Chief Complaint   Patient presents with   • Manic Behavior     Pt presents today with disorganized thoughts, pt is having word salad about recent labs and electrolyte imbalances and pt is paranoid about being poisoned.   • Palpitations     Pt states she can't feel her heart beating in her chest.      BP (!) 177/108   Pulse (!) 168   Temp 37.2 °C (99 °F) (Temporal)   Resp (!) 28   Ht 1.499 m (4' 11\")   Wt 46.7 kg (102 lb 15.3 oz)   LMP 06/18/2021 (Approximate)   SpO2 100%   BMI 20.79 kg/m²     Pt is ambulatory in and out of triage with a steady gait. Appropriate PPE worn throughout entire encounter. Pt taken straight back to blue 16.  "

## 2021-06-21 NOTE — DISCHARGE PLANNING
LSW notified of pt being non-compliant with transport to [facility]. LSW called [employer] for assistance.

## 2021-06-21 NOTE — DISCHARGE PLANNING
Medical Social Work    Referral: Legal Hold    Intervention: Legal Hold Paperwork given to SW by [employer] RN: Meri    Legal Hold Initiated: Date: 06/20/2021  Time: 1762    Legal Hold faxed: Date: 06/21/2021  Time: 3132    Patient’s Insurance Listed on Face Sheet: [employer]    Referrals sent to: [facility] and [facility]    Plan: Patient will transfer to mental health facility once acceptance is obtained.

## 2021-06-21 NOTE — ED NOTES
Pt placed on legal hold. Pt transferred to G36. Report to King MULLER. Belongings handed to receiving RN. Pt walked to restroom and back with RN supervision.

## 2021-06-21 NOTE — DISCHARGE PLANNING
Medical Social Work    Referral: Legal hold Transfer to Mental Health Facility    Intervention: LENORA received call from Padmini at [facility] stating that Dr. Galeana has accepted the patient for admission.  Padmini requested that transport be arranged for 0700.    LENORA arranged for transportation to be set up through [facility] with LORE.    Pt has transport benefit through [facility]; arranged with Megan at [facility].     The pt will be picked up at 0700.     LENORA notified the RN of the departure time as well as accepting facility.     LENORA created transfer packet and placed on chart. Original Legal Hold placed in packet.     Plan: Pt will transfer to [facility] at 0700.

## 2021-06-21 NOTE — DISCHARGE PLANNING
"Alert Team  Pt required [employer] escort to transfer to [facility], refusing [facility] transport.  She continued to be argumentative with staff and PD, insisting she was being wrongly detained, \"I didn't do anything.\"  She was unable to understand when LH explained repeatedly by numerous staff members and PD; delusional, pressured speech.  "

## 2021-06-21 NOTE — ED NOTES
Patient resting in bed. No acute distress.    No complaints at this time.    Fall and safety precautions maintained.    Hourly rounding in progress.     [facility]

## 2021-06-21 NOTE — ED PROVIDER NOTES
ED Provider Note    ER Provider Note         CHIEF COMPLAINT  Chief Complaint   Patient presents with   • Manic Behavior     Pt presents today with disorganized thoughts, pt is having word salad about recent labs and electrolyte imbalances and pt is paranoid about being poisoned.   • Palpitations     Pt states she can't feel her heart beating in her chest.        HPI  Lisa Jones is a 41 y.o. female who presents to the Emergency Department feeling very anxious.  The patient says that she may have a blood disorder.  She denies any fevers or chills.  She thinks that she might have blood poisoning.  At times she is not making any sense.  She denies any chest pain but does say that she has palpitations.  She denies any drug use or alcohol use.  She is difficult to follow in her history at times.    REVIEW OF SYSTEMS  See HPI for further details. All other systems are negative.     PAST MEDICAL HISTORY   has a past medical history of ASTHMA (2011), Hypertension, Thyroid disease (), and Ulcer.    SURGICAL HISTORY   has a past surgical history that includes primary c section (2012).    SOCIAL HISTORY  Social History     Tobacco Use   • Smoking status: Former Smoker     Packs/day: 0.35     Types: Cigarettes     Quit date: 2011     Years since quittin.8   • Smokeless tobacco: Never Used   • Tobacco comment: quit    Vaping Use   • Vaping Use: Every day   • Substances: THC   Substance Use Topics   • Alcohol use: Yes     Comment: occasional   • Drug use: No     Types: Marijuana     Comment: pt denies meth use.      Social History     Substance and Sexual Activity   Drug Use No   • Types: Marijuana    Comment: pt denies meth use.       FAMILY HISTORY  Family History   Problem Relation Age of Onset   • Psychiatric Illness Sister         schetzophrenic   • Arthritis Mother    • Arthritis Maternal Grandmother    • Heart Disease Maternal Grandfather    • Arthritis Paternal Grandmother   "      CURRENT MEDICATIONS  Home Medications     Reviewed by Clarissa Briceño R.N. (Registered Nurse) on 06/20/21 at 1831  Med List Status: Not Addressed   Medication Last Dose Status   buPROPion (WELLBUTRIN) 100 MG TABS  Active   hydrocodone-acetaminophen (NORCO) 5-325 MG TABS per tablet  Active   lorazepam (ATIVAN) 1 MG TABS  Active   Norgestim-Eth Estrad Triphasic 0.18/0.215/0.25 MG-25 MCG TABS  Active                ALLERGIES  Allergies   Allergen Reactions   • Erythromycin      \"Burning sensation in chest\"       PHYSICAL EXAM  VITAL SIGNS: /66   Pulse (!) 59   Temp 37.2 °C (99 °F) (Temporal)   Resp (!) 28   Ht 1.499 m (4' 11\")   Wt 46.7 kg (102 lb 15.3 oz)   LMP 06/18/2021 (Approximate)   SpO2 92%   BMI 20.79 kg/m²      Constitutional: Alert in no apparent distress.  HENT: No signs of trauma, Bilateral external ears normal, Nose normal.   Eyes: Pupils are equal and reactive, Conjunctiva normal, Non-icteric.   Neck: Normal range of motion, No tenderness, Supple, No stridor.   Lymphatic: No lymphadenopathy noted.   Cardiovascular: Regular rate and rhythm, nondisplaced PMI  Thorax & Lungs: No respiratory distress,  No chest tenderness.   Abdomen: Bowel sounds normal, Soft, No tenderness, No masses, No pulsatile masses. No peritoneal signs.  Skin: Warm, Dry, No erythema, No rash.   Back: No bony tenderness, No CVA tenderness.   Extremities: Intact distal pulses, No edema, No tenderness, No cyanosis.  Musculoskeletal: Good range of motion in all major joints. No tenderness to palpation or major deformities noted.   Neurologic: Alert , Normal motor function, Normal sensory function, No focal deficits noted.   Psychiatric: Affect normal, Judgment normal, Mood normal.     DIAGNOSTIC STUDIES / PROCEDURES           LABS  Labs Reviewed   CBC WITH DIFFERENTIAL - Abnormal; Notable for the following components:       Result Value    MCHC 33.5 (*)     All other components within normal limits   BASIC METABOLIC " PANEL - Abnormal; Notable for the following components:    Potassium 3.0 (*)     Glucose 141 (*)     All other components within normal limits   POC BREATHALIZER - Normal   TSH   HCG QUAL SERUM   URINE DRUG SCREEN   ESTIMATED GFR   HCG QUALITATIVE UR       All labs reviewed by me.       The radiologist's interpretation of all radiological studies have been reviewed by me.    COURSE & MEDICAL DECISION MAKING  Pertinent Labs & Imaging studies reviewed. (See chart for details)    This is a 41 y.o. female that presents with pressured speech as well as what appears to be aliyah.  This time I will sure there is no metabolic cause of her symptoms.  This is likely primarily psych.  We will reassess after these labs have returned..     6:52 PM - Patient seen and examined at bedside.     The patient is not suicidal.  The patient is not homicidal.  She does meet criteria for hold given she is unable to care for herself.  She was seen by the alert team.  They agree with this.  She is not pregnant.  She has no significant electrolyte derangements.  She is not intoxicated.  She has no significant anemia.  She is placed on a hold and will be evaluated by psychiatry.          FINAL IMPRESSION  1. Manic behavior (HCC)    2. Palpitations              Electronically signed by: Sina Land M.D., 6/20/2021

## 2021-06-21 NOTE — ED NOTES
Entered room to attempt blood draw to repeat K level. PT became very agitated and verbally aggressive. ERP notified. PO potassium ordered as well as PO ativan

## 2021-06-21 NOTE — CONSULTS
[facility]   TRIAGE ASSESSMENT    Name: Lisa Jones  MRN: [id number]  : 1980  Age: 41 y.o.  Date of assessment: 2021  PCP: Ventura Sheikh D.O.  Persons in attendance: Patient  Patient Location: [facility]    CHIEF COMPLAINT/PRESENTING ISSUE (as stated by patient, ERP, ER RN):   Chief Complaint   Patient presents with   • Manic Behavior     Pt presents today with disorganized thoughts, pt is having word salad about recent labs and electrolyte imbalances and pt is paranoid about being poisoned.   • Palpitations     Pt states she can't feel her heart beating in her chest.       Pt is a 41 year old female that initially came to the ED for numerous health complaints. She presents with pressured and tangential speech, disorganized thoughts, and paranoia. PT does have psych history and reports that she is seen by a psychiatrist at [facility], however, stopped attending a month ago. Pt also reports that she stopped taking her Seroquel a month ago also.PT demonstrates an inability to care for herself and would benefit from further treatment.             CURRENT LIVING SITUATION/SOCIAL SUPPORT/FINANCIAL RESOURCES: Pt states that she lives with her son and grandparents, however, they are family in vacation in [place]. She is currently not employed and states that she cannot work due to her numerous health problems.     BEHAVIORAL HEALTH/SUBSTANCE USE TREATMENT HISTORY  Does patient/parent report a history of prior behavioral health treatment for patient?   Yes:  EMR documents [facility] in . PT reports taking Seroquel and seeing psychiatrist but not compliant x 1 mo       SAFETY ASSESSMENT - SELF  Does patient acknowledge current or past symptoms of dangerousness to self or is previous history noted? no  Does parent/significant other report patient has current or past symptoms of dangerousness to self? NA  Does presenting problem suggest symptoms of dangerousness to self? No  "denies SI    SAFETY ASSESSMENT - OTHERS  Does patient acknowledge current or past symptoms of aggressive behavior or risk to others or is previous history noted? no  Does parent/significant other report patient has current or past symptoms of aggressive behavior or risk to others?  NA  Does presenting problem suggest symptoms of dangerousness to others? No Denies HI    LEGAL HISTORY  Does patient acknowledge history of arrest/alf/FDC or is previous history noted? no    Crisis Safety Plan completed and copy given to patient? no and N\A    ABUSE/NEGLECT SCREENING  Does patient report feeling “unsafe” in his/her home, or afraid of anyone?  no  Does patient report any history of physical, sexual, or emotional abuse?  no  Does parent or significant other report any of the above? no  Is there evidence of neglect by self?  no  Is there evidence of neglect by a caregiver? no  Does the patient/parent report any history of CPS/APS/police involvement related to suspected abuse/neglect or domestic violence? no  Based on the information provided during the current assessment, is a mandated report of suspected abuse/neglect being made?  No    SUBSTANCE USE SCREENING  Yes:  Humberto all substances used in the past 30 days: PT denies any alcohol or substance use.      Last Use Amount   []   Alcohol     []   Marijuana     []   Heroin     []   Prescription Opioids  (used without prescription, for    recreation, or in excess of prescribed amount)     []   Other Prescription  (used without prescription, for    recreation, or in excess of prescribed amount)     []   Cocaine      []   Methamphetamine     []   \"\" drugs (ectasy, MDMA)     []   Other substances        UDS results: Pending   Breathalyzer results: 0.00    What consequences does the patient associate with any of the above substance use and or addictive behaviors? None    Risk factors for detox (check all that apply):  []  Seizures   []  Diaphoretic (sweating)   []  " Tremors   []  Hallucinations   []  Increased blood pressure   []  Decreased blood pressure   []  Other   [x]  None      [x] Patient education on risk factors for detoxification and instructed to return to ER as needed.      MENTAL STATUS   Participation: Verbally monopolizing, Guarded, Defensive and Resistant  Grooming: Casual  Orientation: Evidence of delusions present  Behavior: Agitated and Hyperactive  Eye contact: Intense  Mood: Anxious, Manic and Irritable  Affect: Labile, Expansive, Incongruent with content and Anxious  Thought process: Tangential, Flight of ideas, Loose associations and Perseveration  Thought content: Ideas of reference, Evidence of delusion and Paranoia  Speech: Pressured, Rapid and Hypertalkative  Perception: Illusions  Memory:  Poor memory for chronology of events  Insight: Poor  Judgment:  Poor  Other:    Collateral information:   Source:  [] Significant other present in person:   [] Significant other by telephone  [] [facility]   [x] [facility]  [x] [facility]  [x] Other: ERP     [] Unable to complete full assessment due to:  [] Acute intoxication  [] Patient declined to participate/engage  [] Patient verbally unresponsive  [] Significant cognitive deficits  [x] Significant perceptual distortions or behavioral disorganization  [] Other:      CLINICAL IMPRESSIONS:  Primary:  Maritza  Secondary:         IDENTIFIED NEEDS/PLAN:  [Trigger DISPOSITION list for any items marked]    []  Imminent safety risk - self [] Imminent safety risk - others   []  Acute substance withdrawal [x]  Psychosis/Impaired reality testing   [x]  Mood/anxiety []  Substance use/Addictive behavior   [x]  Maladaptive behaviro []  Parent/child conflict   []  Family/Couples conflict [x]  Biomedical   []  Housing []  Financial   []   Legal  Occupational/Educational   []  Domestic violence []  Other:     Recommended Plan of Care:  Actively being addressed by [facility], [facility], [facility] and [facility]; pt denies SI/HI, [place] Suicide Scale no risk, no sitter requirement.  *Telesitter may not be utilized for moderate or high risk patients    Has the Recommended Plan of Care/Level of Observation been reviewed with the patient's assigned nurse? yes    Does patient/parent or guardian express agreement with the above plan? No; pt vocalizes desire to go home.    Referral appointment(s) scheduled? N\A    Alert team only: L2K for inability to care for self; acute aliyah/psychosis. Off psychotropic medication and put of PMH  TX x 1 month    I have discussed findings and recommendations with Dr. Land who is in agreement with these recommendations.     Referral information sent to the following community providers : [facility], [facility]    If applicable : Referred  to :[facility] for legal hold follow up pending certification.      Meri Quiroga R.N./Mario Bruce RN  6/20/2021

## 2021-06-21 NOTE — CONSULTS
PSYCHIATRY CONSULT TEAM NOTE: Consult received and cancelled. Patient has been accepted by [facility]. Thank you.

## 2021-07-20 ENCOUNTER — TELEPHONE (OUTPATIENT)
Dept: CARDIOLOGY | Facility: MEDICAL CENTER | Age: 41
End: 2021-07-20

## 2021-07-20 NOTE — TELEPHONE ENCOUNTER
Called patient in regards to her NP appt with Dr. Santamaria on 07/29/2021 at 8:40am. Pt stated she has not seen a previous Cardiologist in the past, imaging/blood work is up to date and within chart.Let her know she will have an EKG at her appointment, and to not wear any lotion or the electrodes won't stick. Confirmed appt time and location, pt verbally understood.

## 2021-07-29 ENCOUNTER — OFFICE VISIT (OUTPATIENT)
Dept: CARDIOLOGY | Facility: MEDICAL CENTER | Age: 41
End: 2021-07-29
Payer: MEDICAID

## 2021-07-29 VITALS
WEIGHT: 101 LBS | RESPIRATION RATE: 19 BRPM | OXYGEN SATURATION: 99 % | BODY MASS INDEX: 20.36 KG/M2 | HEART RATE: 106 BPM | SYSTOLIC BLOOD PRESSURE: 108 MMHG | DIASTOLIC BLOOD PRESSURE: 82 MMHG | HEIGHT: 59 IN

## 2021-07-29 DIAGNOSIS — F99 PSYCHIATRIC ILLNESS: ICD-10-CM

## 2021-07-29 DIAGNOSIS — R00.2 PALPITATIONS: ICD-10-CM

## 2021-07-29 DIAGNOSIS — R00.0 SINUS TACHYCARDIA: ICD-10-CM

## 2021-07-29 LAB — EKG IMPRESSION: NORMAL

## 2021-07-29 PROCEDURE — 99244 OFF/OP CNSLTJ NEW/EST MOD 40: CPT | Performed by: INTERNAL MEDICINE

## 2021-07-29 PROCEDURE — 93000 ELECTROCARDIOGRAM COMPLETE: CPT | Performed by: INTERNAL MEDICINE

## 2021-07-29 RX ORDER — MONTELUKAST SODIUM 10 MG/1
TABLET ORAL
COMMUNITY
Start: 2021-06-30

## 2021-07-29 RX ORDER — ALBUTEROL SULFATE 90 UG/1
AEROSOL, METERED RESPIRATORY (INHALATION)
COMMUNITY
Start: 2021-05-31 | End: 2021-07-29

## 2021-07-29 RX ORDER — QUETIAPINE FUMARATE 25 MG/1
25 TABLET, FILM COATED ORAL
COMMUNITY
Start: 2021-07-09 | End: 2021-10-04 | Stop reason: DRUGHIGH

## 2021-07-29 RX ORDER — ALUMINUM CHLORIDE 20 %
SOLUTION, NON-ORAL TOPICAL
COMMUNITY
Start: 2021-07-08

## 2021-07-29 RX ORDER — AMLODIPINE BESYLATE 5 MG/1
TABLET ORAL
COMMUNITY
Start: 2020-01-03

## 2021-07-29 RX ORDER — ALUMINUM CHLORIDE 20 %
SOLUTION, NON-ORAL TOPICAL
COMMUNITY
Start: 2021-05-28 | End: 2021-07-29

## 2021-07-29 RX ORDER — MENTHOL 5.8 MG/1
100 LOZENGE ORAL
COMMUNITY
Start: 2021-05-26

## 2021-07-29 RX ORDER — POLYETHYLENE GLYCOL-3350 AND ELECTROLYTES 236; 6.74; 5.86; 2.97; 22.74 G/274.31G; G/274.31G; G/274.31G; G/274.31G; G/274.31G
POWDER, FOR SOLUTION ORAL
COMMUNITY
Start: 2021-07-16 | End: 2021-07-29

## 2021-07-29 RX ORDER — HALOPERIDOL 10 MG/1
TABLET ORAL
COMMUNITY
Start: 2021-06-30 | End: 2021-07-29

## 2021-07-29 RX ORDER — AMLODIPINE BESYLATE 5 MG/1
TABLET ORAL
COMMUNITY
Start: 2021-07-28 | End: 2021-07-29

## 2021-07-29 RX ORDER — POLYETHYLENE GLYCOL 3350 17 G/17G
17 POWDER, FOR SOLUTION ORAL DAILY
COMMUNITY

## 2021-07-29 RX ORDER — POLYETHYLENE GLYCOL 3350 17 G/17G
POWDER, FOR SOLUTION ORAL
COMMUNITY
Start: 2016-02-24 | End: 2021-07-29

## 2021-07-29 RX ORDER — BENZTROPINE MESYLATE 1 MG/1
TABLET ORAL
COMMUNITY
Start: 2021-06-29 | End: 2021-07-29

## 2021-07-29 ASSESSMENT — FIBROSIS 4 INDEX: FIB4 SCORE: 0.83

## 2021-07-29 NOTE — PROGRESS NOTES
Chief Complaint   Patient presents with   • Palpitations       Subjective:   Lisa Jones is a 41 y.o. female who presents today being seen in consult on request of Dr. María Kenney secondary to palpitations and multiple complaints.  Pressured and tangential thoughts.  Obvious psychiatric diagnosis.  Anxiety and sinus tachycardia.  On Seroquel.  Patient unsure of her psychiatric diagnosis.  Biotel 1 year ago unremarkable.  Does not smoke, denies alcohol use.  Currently not working.  Some caffeine use.  Has flight of ideas unable to give a coherent history.  Labs have been unremarkable, normal TFTs.  Echocardiogram performed at [facility].  Electrocardiograms have been normal.    Past Medical History:   Diagnosis Date   • ASTHMA 2011   • Hypertension    • Thyroid disease    • Ulcer      Past Surgical History:   Procedure Laterality Date   • PRIMARY C SECTION  2012    Performed by PANFILO KNIGHT at [facility]     Family History   Problem Relation Age of Onset   • Psychiatric Illness Sister         schetzophrenic   • Arthritis Mother    • Arthritis Maternal Grandmother    • Heart Disease Maternal Grandfather    • Arthritis Paternal Grandmother      Social History     Socioeconomic History   • Marital status:      Spouse name: Not on file   • Number of children: Not on file   • Years of education: Not on file   • Highest education level: Not on file   Occupational History   • Not on file   Tobacco Use   • Smoking status: Former Smoker     Packs/day: 0.35     Types: Cigarettes     Quit date: 2011     Years since quittin.9   • Smokeless tobacco: Never Used   • Tobacco comment: quit    Vaping Use   • Vaping Use: Every day   • Substances: THC   Substance and Sexual Activity   • Alcohol use: Yes     Comment: occasional   • Drug use: No     Types: Marijuana     Comment: pt denies meth use.   • Sexual activity: Yes     Partners: Male     Birth  "control/protection: Pill   Other Topics Concern   • Not on file   Social History Narrative   • Not on file     Social Determinants of Health     Financial Resource Strain:    • Difficulty of Paying Living Expenses:    Food Insecurity:    • Worried About Running Out of Food in the Last Year:    • Ran Out of Food in the Last Year:    Transportation Needs:    • Lack of Transportation (Medical):    • Lack of Transportation (Non-Medical):    Physical Activity:    • Days of Exercise per Week:    • Minutes of Exercise per Session:    Stress:    • Feeling of Stress :    Social Connections:    • Frequency of Communication with Friends and Family:    • Frequency of Social Gatherings with Friends and Family:    • Attends [employer] Services:    • Active Member of Clubs or Organizations:    • Attends Club or Organization Meetings:    • Marital Status:    Intimate Partner Violence:    • Fear of Current or Ex-Partner:    • Emotionally Abused:    • Physically Abused:    • Sexually Abused:      Allergies   Allergen Reactions   • Morphine      Other reaction(s): Vomiting, water retention   • Erythromycin      \"Burning sensation in chest\"     Outpatient Encounter Medications as of 7/29/2021   Medication Sig Dispense Refill   • DRYSOL 20 % external solution APPLY TWICE A WEEK AT BEDTIME AS NEEDED     • amLODIPine (NORVASC) 5 MG Tab AMLODIPINE BESYLATE 5 MG TABS     • V-R STOOL SOFTENER 100 MG Cap Take 100 mg by mouth.     • montelukast (SINGULAIR) 10 MG Tab TAKE 1 TABLET BY MOUTH ONCE DAILY FOR ALLERGY AND OR ASTHMA TYPE SYMPTOMS     • QUEtiapine (SEROQUEL) 25 MG Tab Take 25 mg by mouth.     • polyethylene glycol/lytes (MIRALAX) 17 g Pack Take 17 g by mouth every day.     • [DISCONTINUED] albuterol 108 (90 Base) MCG/ACT Aero Soln inhalation aerosol INHALE 1 PUFF BY MOUTH EVERY 4 HOURS (Patient not taking: Reported on 7/29/2021)     • [DISCONTINUED] aluminum chloride (DRYSOL) 20 % external solution DRYSOL 20 % SOLN (Patient not taking: " "Reported on 7/29/2021)     • [DISCONTINUED] amLODIPine (NORVASC) 5 MG Tab  (Patient not taking: Reported on 7/29/2021)     • [DISCONTINUED] benztropine (COGENTIN) 1 MG Tab TAKE 1 TABLET BY MOUTH AT BEDTIME  PM (Patient not taking: Reported on 7/29/2021)     • [DISCONTINUED] haloperidol (HALDOL) 10 MG tablet TAKE 1 TABLET BY MOUTH AT BEDTIME  PM (Patient not taking: Reported on 7/29/2021)     • [DISCONTINUED] GAVILYTE-G 236 g Recon Soln FOLLOW [facility] HAND OUT. (Patient not taking: Reported on 7/29/2021)     • [DISCONTINUED] polyethylene glycol 3350 (MIRALAX) 17 GM/SCOOP Powder POLYETHYLENE GLYCOL 3350 POWD (Patient not taking: Reported on 7/29/2021)     • [DISCONTINUED] psyllium (METAMUCIL) 58.12 % Pack Take 1 Packet by mouth every day.     • [DISCONTINUED] hydrocodone-acetaminophen (NORCO) 5-325 MG TABS per tablet Take 1-2 Tabs by mouth every 6 hours as needed. (Patient not taking: Reported on 7/29/2021) 20 Each 0   • [DISCONTINUED] lorazepam (ATIVAN) 1 MG TABS Take 1 Tab by mouth every four hours as needed for Anxiety. (Patient not taking: Reported on 7/29/2021) 10 Tab 0   • [DISCONTINUED] buPROPion (WELLBUTRIN) 100 MG TABS Take 100 mg by mouth 2 times a day.   (Patient not taking: Reported on 7/29/2021)     • [DISCONTINUED] Norgestim-Eth Estrad Triphasic 0.18/0.215/0.25 MG-25 MCG TABS Take 1 Tab by mouth every day. (Patient not taking: Reported on 7/29/2021) 28 Tab 11     No facility-administered encounter medications on file as of 7/29/2021.     ROS     Objective:   /82 (BP Location: Left arm, Patient Position: Sitting, BP Cuff Size: Adult)   Pulse (!) 106   Resp 19   Ht 1.499 m (4' 11\")   Wt 45.8 kg (101 lb)   SpO2 99%   BMI 20.40 kg/m²     Physical Exam   Constitutional: She is oriented to person, place, and time. She appears well-developed.   HENT:   Head: Normocephalic and atraumatic.   Eyes: Pupils are equal, round, and reactive to light.   Cardiovascular: Regular rhythm and " normal heart sounds. Tachycardia present. Exam reveals no gallop and no friction rub.   No murmur heard.  Pulmonary/Chest: Effort normal and breath sounds normal.   Abdominal: Soft. Bowel sounds are normal.   Musculoskeletal:         General: Normal range of motion.      Cervical back: Normal range of motion and neck supple.   Neurological: She is alert and oriented to person, place, and time. No cranial nerve deficit.   Nervous with flight of ideas   Skin: Skin is warm.       Assessment:     1. Palpitations  EKG    Holter Monitor Study   2. Psychiatric illness     3. Sinus tachycardia         Medical Decision Making:  Today's Assessment / Status / Plan:   1.  Palpitations.  Sinus rhythm and sinus tachycardia.  Unlikely to require treatment.  Most likely secondary to anxiety and psychiatric diagnosis.  Will check Holter monitor.  Get report of echocardiogram.

## 2021-08-18 ENCOUNTER — NON-PROVIDER VISIT (OUTPATIENT)
Dept: CARDIOLOGY | Facility: MEDICAL CENTER | Age: 41
End: 2021-08-18
Payer: MEDICAID

## 2021-08-18 DIAGNOSIS — I49.1 PREMATURE ATRIAL CONTRACTION: ICD-10-CM

## 2021-08-18 DIAGNOSIS — I49.3 PVC (PREMATURE VENTRICULAR CONTRACTION): ICD-10-CM

## 2021-08-24 DIAGNOSIS — R00.2 PALPITATIONS: ICD-10-CM

## 2021-08-25 LAB — EKG IMPRESSION: NORMAL

## 2021-08-25 PROCEDURE — 93224 XTRNL ECG REC UP TO 48 HRS: CPT | Performed by: INTERNAL MEDICINE

## 2021-08-26 ENCOUNTER — PATIENT MESSAGE (OUTPATIENT)
Dept: CARDIOLOGY | Facility: MEDICAL CENTER | Age: 41
End: 2021-08-26

## 2021-10-04 ENCOUNTER — OFFICE VISIT (OUTPATIENT)
Dept: BEHAVIORAL HEALTH | Facility: PSYCHIATRIC FACILITY | Age: 41
End: 2021-10-04
Payer: MEDICAID

## 2021-10-04 VITALS — WEIGHT: 101.8 LBS | BODY MASS INDEX: 21.96 KG/M2 | HEIGHT: 57 IN

## 2021-10-04 DIAGNOSIS — F20.3 UNDIFFERENTIATED SCHIZOPHRENIA (HCC): ICD-10-CM

## 2021-10-04 PROCEDURE — 99999 PR NO CHARGE: CPT | Performed by: STUDENT IN AN ORGANIZED HEALTH CARE EDUCATION/TRAINING PROGRAM

## 2021-10-04 RX ORDER — QUETIAPINE FUMARATE 50 MG/1
TABLET, FILM COATED ORAL
Qty: 120 TABLET | Refills: 2 | Status: SHIPPED | OUTPATIENT
Start: 2021-10-04 | End: 2021-10-19

## 2021-10-04 ASSESSMENT — FIBROSIS 4 INDEX: FIB4 SCORE: 0.83

## 2021-10-04 NOTE — PROGRESS NOTES
"[facility]  Medication Management Note    Evaluation completed by: Marques Salazar D.O.   Date of Service: 10/04/21   Appointment type: in-office appointment.    Information below was collected from: patient    Special language or communication needs: No    CHIEF COMPLAINT/REASON FOR VISIT  \"I've had several identity thefts.\"    HISTORY OF PRESENT ILLNESS  Lisa Jones is a 41 y.o. old female who presents today for follow up. She is uncertain as to what specific pathology she is being treated for. She reports that she has continued taking quetiapine one and one half tablets every night. She endorses good benefit with anxiety and insomnia from this medication. She denies any side effect associated with quetiapine.     Patient reports that she is dealing with several accounts of identity theft. She states that she has just come from a meeting with a  who is helping her with this. She also reports in a very short succession that her parents may not be biologically related to her based on gene sampling and that she has had to change the locks on their mailbox several times due to stolen mail. She reports numerous physical ailments including a RNA/mRNA blood disease that her doctors have previously reported to the CDC database on her. She states she has heart attacks monthly, goes into comas, her heart valves are all clogged shut, and she drops heart beats. She sporadically states her blood sugar is unregulated as well. She does not believe she has any valid psychiatric diagnosis.     She is overall unable to communicate accurate timelines and specific symptoms during interview. She is in agreement with trial of increased quetiapine.     CURRENT MEDICATIONS  Quetiapine 75mg HS for insomnia, anxiety, and psychosis    ALLERGIES  Allergies   Allergen Reactions   • Morphine      Other reaction(s): Vomiting, water retention   • Erythromycin      \"Burning sensation in chest\"    " "    PAST PSYCHIATRIC HISTORY  States she has been hospitalized 2x at [place], but states at other points during the interview that she has been placed on L2K several times for inability to care for herself. She denies any history of suicidal attempts or ideation. She has trialed haldol, risperidone, depakote, wellbutrin, abilify, and quetiapine. Of these, quetiapine was effective. She denies any family history of psychiatric diagnoses or suicide attempts.     MEDICAL HISTORY  Past Medical History:  4/29/2011: ASTHMA  No date: Hypertension  2006: Thyroid disease  No date: Ulcer   Past Surgical History:   Procedure Laterality Date   • PRIMARY C SECTION  5/16/2012    Performed by PANFILO KNIGHT at [facility]       PHYSICAL EXAMINATION  Vital signs: There were no vitals taken for this visit.  Musculoskeletal: Gait is normal. No gross abnormalities noted.   Abnormal movements: None appreciated    MENTAL STATUS EXAMINATION    General: Lisa Jones appears stated age and exhibits grooming which is casual.  Hygiene is good.      Behavior: Pt is hyperactive.  No apparent distress.  Eye contact is appropriate. She is overall energetic and anxious during interview.  Psychomotor: Psychomotor agitation: frequent erratic movements.   Speech: rapid   Language: Fluent in English  Mood: \"Anxious\"  Affect: Expansive,  Thought Process: Flight of ideas  Thought Content: denies suicidal ideation, denies homicidal ideation. Evidence of delusion  Perception: denies auditory hallucinations, denies visual hallucinations. No delusions noted on interview.   Attention span and concentration: Impaired due to disorganization  Orientation: Fully Oriented  Recent and remote memory: No gross evidence of memory deficits  Insight: Poor  Judgment: Limited    SAFETY ASSESSMENT - RISK TO SELF  • Current suicide attempts or self harm: No  • Past suicide attempts or self harm: No  • History of suicide by family member: " No  • Recent change in amount/specificity/intensity of suicidal thoughts or self-harm behavior: No  • Ongoing substance use disorder: Yes, daily cannabis.      SAFETY ASSESSMENT - RISK TO OTHERS  • Current aggressive behavior or risk to others: No  • Past aggressive behavior or risk to others: No  • Recent change in amount/specificity/intensity of thoughts or threats to harm others? No     CURRENT RISK ASSESSMENT       Suicide: Low       Homicide: Low       Self-Harm: Low       Relapse: Low       Crisis Safety Plan Reviewed Yes    ASSESSMENT  Lisa Jones is a 41 y.o. old female who presents as elevated, disorganized, paranoid, and anxious. She describes persecution in virtually every interaction and is in a constant state of anxiety surrounding her belief in numerous physical ailments that she believes are not properly addressed by her medical team. She lacks sufficient symptoms for diagnosis of full manic episode, possible that her anxiety is more responsible for rapid speech and expansive mood, but will explore this further on follow up. She has virtually no insight into her thought content and is resistant to attempts to discuss the likelihood of having monthly/constant heart attacks, for example. Interview is not the most productive source of information due to disorganization.    She is agreeable to increasing her quetiapine dose as this will likely help with her daily anxiety, help her to sleep, and ease some of her distressing thought content. No acute safety concerns today.     DIAGNOSES  • Unspecified Schizophrenia Spectrum and Other Psychotic Disorder    PLAN  Problem 1:  • Medications:   a. Increase quetiapine to 50mg daily, 50mg daily PRN, and 100mg HS for mood/psychosis  • Psychotherapy:   a. Scheduled with Dr. Lo  • Labs/studies: n/a  • Other: n/a    • Medication options, alternatives (including no medications) and medication risks/benefits/side effects were discussed in detail.  • The  patient was advised to call, message clinician on MyChart, or come in to the clinic if symptoms worsen or if questions/issues regarding their medications arise.  The patient verbalized understanding and agreement.    • The patient was educated to call 911, call the suicide hotline, or go to the local ER if having thoughts of suicide or homicide.  The patient verbalized understanding and agreement.   • The proposed treatment plan was discussed with the patient who was provided the opportunity to ask questions and make suggestions regarding alternative treatment. Patient verbalized understanding and expressed agreement with the plan.      Return to clinic in 1 month or sooner if symptoms worsen.    This appointment was supervised by attending psychiatrist, Rick Torres MD, who agrees with assessment and treatment plan.  See attending attestation for more details.     Marques Salazar D.O.  10/04/21

## 2021-10-18 ENCOUNTER — OFFICE VISIT (OUTPATIENT)
Dept: MEDICAL GROUP | Facility: CLINIC | Age: 41
End: 2021-10-18
Payer: MEDICAID

## 2021-10-18 VITALS
TEMPERATURE: 98 F | DIASTOLIC BLOOD PRESSURE: 58 MMHG | BODY MASS INDEX: 20.36 KG/M2 | SYSTOLIC BLOOD PRESSURE: 96 MMHG | OXYGEN SATURATION: 96 % | WEIGHT: 101 LBS | HEART RATE: 64 BPM | RESPIRATION RATE: 12 BRPM | HEIGHT: 59 IN

## 2021-10-18 DIAGNOSIS — F20.3 UNDIFFERENTIATED SCHIZOPHRENIA (HCC): ICD-10-CM

## 2021-10-18 DIAGNOSIS — E87.6 HYPOKALEMIA: ICD-10-CM

## 2021-10-18 DIAGNOSIS — G62.9 NEUROPATHY: ICD-10-CM

## 2021-10-18 PROCEDURE — 99204 OFFICE O/P NEW MOD 45 MIN: CPT | Performed by: STUDENT IN AN ORGANIZED HEALTH CARE EDUCATION/TRAINING PROGRAM

## 2021-10-18 ASSESSMENT — FIBROSIS 4 INDEX: FIB4 SCORE: 0.83

## 2021-10-18 NOTE — ASSESSMENT & PLAN NOTE
"We will check iron levels, B12 levels  Follow-up in 2 months  Patient requesting referral to \"neuropathist\"  I asked her if she would like referral to neurology and she denied that.  A neuropathy this is the only one she would see.  I attempted to elicit with other type of medical doctor she might be referring to and cannot get a straight answer.  "

## 2021-10-18 NOTE — PROGRESS NOTES
"Subjective:     CC: Multiple concerns    HPI:   Lisa presents today with     Problem   Hypokalemia    Patient potassium level of 3 last ER visit in June  Reviewed her EKG which showed sinus tachycardia without other abnormalities  She has no active chest pain or syncope     Neuropathy    Patient with some complaints of tingling and numbness in the fingers and toes.  She has no weakness in the extremities.  She did thinks that her lab values were changed and there is something wrong with her red blood cells including her DNA.  She says that she did not have the normal DNA and that she has the \"mRNA thing\".     Undifferentiated Schizophrenia (Hcc)    Patient here today for follow-up of schizophrenia.  She has been seen by psychiatry for this and is currently on Seroquel.  They recently increased her dose on this.  She is extremely tangential during the entire interview making it difficult to discern other medical problems.  She has persecutory thoughts including the hospital and lab changing her lab values, people inserting multiple pacemakers in here.  She has additional thoughts that she flatlined's at night with her heart stopping frequently.  She otherwise has 1 doctor which she says does not change her values and knows that everyone else is changing those values and we should not trust them.         Current Outpatient Medications Ordered in Epic   Medication Sig Dispense Refill   • QUEtiapine (SEROQUEL) 50 MG tablet Take 1 Tablet by mouth every morning AND 2 Tablets every evening. May also take 1 Tablet 1 time a day as needed. 120 Tablet 2   • amLODIPine (NORVASC) 5 MG Tab AMLODIPINE BESYLATE 5 MG TABS     • DRYSOL 20 % external solution APPLY TWICE A WEEK AT BEDTIME AS NEEDED (Patient not taking: Reported on 10/18/2021)     • V-R STOOL SOFTENER 100 MG Cap Take 100 mg by mouth. (Patient not taking: Reported on 10/18/2021)     • montelukast (SINGULAIR) 10 MG Tab TAKE 1 TABLET BY MOUTH ONCE DAILY FOR ALLERGY " "AND OR ASTHMA TYPE SYMPTOMS (Patient not taking: Reported on 10/18/2021)     • polyethylene glycol/lytes (MIRALAX) 17 g Pack Take 17 g by mouth every day. (Patient not taking: Reported on 10/18/2021)       No current [facility]-ordered facility-administered medications on file.       ROS:  Gen: no fevers/chills, no changes in weight  Eyes: no changes in vision  ENT: no sore throat, no hearing loss, no bloody nose  Pulm: no SOB, no cough  CV: no chest pain, no palpitations  GI: no nausea/vomiting, no diarrhea  : no dysuria  MSk: no myalgias  Skin: no rash  Neuro: no headaches, no numbness/tingling  Heme/Lymph: no easy bruising      Objective:     Exam:  BP (!) 96/58 (BP Location: Right arm, Patient Position: Sitting, BP Cuff Size: Adult)   Pulse 64   Temp 36.7 °C (98 °F) (Temporal)   Resp 12   Ht 1.499 m (4' 11\")   Wt 45.8 kg (101 lb)   SpO2 96%   BMI 20.40 kg/m²  Body mass index is 20.4 kg/m².    Gen: Alert and oriented, No apparent distress.  HEENT: PERRL, EOMI, external ears normal bilat, nose roughly midline, atraumatic, normocephalic  Neck: Neck is supple without lymphadenopathy.  Lungs: CTA bilaterally, no wheezes, rhonchi, or rales, symmetric chest rise  CV: Regular rate and rhythm. No murmurs, rubs, or gallops.  GI:       No rebound, no guarding, normal bowel sounds  :      No CVA tenderness, bladder non-tender to palp  Ext: No clubbing, cyanosis, edema.  Neuro: Gait appropriate for age, no focal deficits  Psych: Extremely tangential thought process, feelings of persecution, conspiracy theories mention throughout interview, difficult to reorient, pressured speech  Skin:    No rashes, no jaundice      Labs:     Assessment & Plan:     41 y.o. female with the following -     Problem List Items Addressed This Visit     Undifferentiated schizophrenia (HCC)     Continue with psychiatry for schizophrenia  Continue Seroquel  Follow-up in 3 months         Hypokalemia     Recheck potassium level lab slip provided " "follow-up in 2 months unless other symptoms occur         Relevant Orders    Comp Metabolic Panel    Neuropathy     We will check iron levels, B12 levels  Follow-up in 2 months  Patient requesting referral to \"neuropathist\"  I asked her if she would like referral to neurology and she denied that.  A neuropathy this is the only one she would see.  I attempted to elicit with other type of medical doctor she might be referring to and cannot get a straight answer.         Relevant Orders    IRON/TOTAL IRON BIND    VITAMIN B12          I spent a total of 45 minutes with record review, exam, communication with the patient, communication with other providers, and documentation of this encounter.      Return in about 3 months (around 1/18/2022).    Please note that this dictation was created using voice recognition software. I have made every reasonable attempt to correct obvious errors, but I expect that there are errors of grammar and possibly content that I did not discover before finalizing the note.      "

## 2021-10-19 RX ORDER — QUETIAPINE FUMARATE 100 MG/1
TABLET, FILM COATED ORAL
Qty: 60 TABLET | Refills: 2 | Status: SHIPPED | OUTPATIENT
Start: 2021-10-19

## 2021-10-22 ENCOUNTER — OFFICE VISIT (OUTPATIENT)
Dept: INTERNAL MEDICINE | Facility: OTHER | Age: 41
End: 2021-10-22
Payer: MEDICAID

## 2021-10-22 VITALS
WEIGHT: 99.5 LBS | OXYGEN SATURATION: 100 % | TEMPERATURE: 98.8 F | DIASTOLIC BLOOD PRESSURE: 82 MMHG | SYSTOLIC BLOOD PRESSURE: 119 MMHG | BODY MASS INDEX: 20.06 KG/M2 | HEART RATE: 71 BPM | HEIGHT: 59 IN

## 2021-10-22 DIAGNOSIS — F20.3 UNDIFFERENTIATED SCHIZOPHRENIA (HCC): ICD-10-CM

## 2021-10-22 DIAGNOSIS — G62.9 NEUROPATHY: ICD-10-CM

## 2021-10-22 PROCEDURE — 99204 OFFICE O/P NEW MOD 45 MIN: CPT | Mod: GC | Performed by: STUDENT IN AN ORGANIZED HEALTH CARE EDUCATION/TRAINING PROGRAM

## 2021-10-22 ASSESSMENT — ENCOUNTER SYMPTOMS
PALPITATIONS: 0
INSOMNIA: 0
FEVER: 0
HALLUCINATIONS: 0
NERVOUS/ANXIOUS: 0
DEPRESSION: 0
VOMITING: 0
DOUBLE VISION: 0
HEARTBURN: 0
HEADACHES: 0
NAUSEA: 0
CHILLS: 0
DIZZINESS: 0
BLURRED VISION: 0

## 2021-10-22 ASSESSMENT — FIBROSIS 4 INDEX: FIB4 SCORE: 0.83

## 2021-10-22 NOTE — PROGRESS NOTES
"Chief Complaint   Patient presents with   • Referral Needed       HISTORY OF PRESENT ILLNESS:     Ms. Escamilla is a 41-year-old female with history of unspecified schizophrenia presented today to our clinic for new establishment.  Patient was noted to be disorganized and anxious with rapid speech and expansive mood.  History is therefore quite limited and is otherwise unreliable.    Initially patient was requesting referral for \"neuropathist\" stating that she has pain in her hands and feet.  When asked if patient was referring to a neurologist, patient stated no \"neuropathist\" and then progressed to discuss that she was having liver and kidney pain.  Patient states that she was seen in the hospital earlier this summer and that her labs although reported normal via chart review, were in fact changed by an outside source for unknown reasons.    On chart review patient was noted to be seen by psychiatry, most recently October 4 of 2021.  At the time it appears she was agreeable to increase her quetiapine dose to 50 mg daily, 50 mg daily as needed, and 100 mg at night for mood/psychosis management.  When discussing this with patient, patient became extremely agitated stating \"I thought I was here to discuss my medicine on my mood\" and then began stating that she does not even take this medicine although prescribed.    When discussing patient's mood, patient states that she has noticed herself speaking more rapidly than normal, but denies any depression, anxiety, insomnia, visual or auditory hallucinations, SI, HI.  Patient states that she has a regular appetite, denies any grandiose thoughts at this time.    Past Medical History:   Diagnosis Date   • ASTHMA 4/29/2011   • Hypertension    • Thyroid disease 2006   • Ulcer        Patient Active Problem List    Diagnosis Date Noted   • Hypokalemia 10/18/2021   • Neuropathy 10/18/2021   • Undifferentiated schizophrenia (HCC) 10/04/2021   • Psychiatric illness 07/29/2021   • " Palpitations 07/29/2021   • Sinus tachycardia 07/29/2021   • ASTHMA 04/29/2011       Allergies:Morphine and Erythromycin    Current Outpatient Medications   Medication Sig Dispense Refill   • QUEtiapine (SEROQUEL) 100 MG Tab Take 0.5 Tablets by mouth every morning AND 1 Tablet every evening. May also take 0.5 Tablets 1 time a day as needed. 60 Tablet 2   • DRYSOL 20 % external solution APPLY TWICE A WEEK AT BEDTIME AS NEEDED     • amLODIPine (NORVASC) 5 MG Tab AMLODIPINE BESYLATE 5 MG TABS     • V-R STOOL SOFTENER 100 MG Cap Take 100 mg by mouth.     • montelukast (SINGULAIR) 10 MG Tab TAKE 1 TABLET BY MOUTH ONCE DAILY FOR ALLERGY AND OR ASTHMA TYPE SYMPTOMS     • polyethylene glycol/lytes (MIRALAX) 17 g Pack Take 17 g by mouth every day.       No current facility-administered medications for this visit.       Social History     Tobacco Use   • Smoking status: Former Smoker     Packs/day: 0.35     Types: Cigarettes     Quit date: 8/14/2011     Years since quitting: 10.1   • Smokeless tobacco: Never Used   • Tobacco comment: quit 2/11   Vaping Use   • Vaping Use: Every day   • Substances: THC   Substance Use Topics   • Alcohol use: Not Currently     Comment: occasional   • Drug use: No     Types: Marijuana     Comment: pt denies meth use.       Family History   Problem Relation Age of Onset   • Psychiatric Illness Sister         schetzophrenic   • Arthritis Mother    • Arthritis Maternal Grandmother    • Heart Disease Maternal Grandfather    • Arthritis Paternal Grandmother          Review of Systems   Review of Systems   Constitutional: Negative for chills and fever.   HENT: Negative for hearing loss and tinnitus.    Eyes: Negative for blurred vision and double vision.   Cardiovascular: Negative for chest pain and palpitations.   Gastrointestinal: Negative for heartburn, nausea and vomiting.   Genitourinary: Negative for dysuria and urgency.   Skin: Negative for itching and rash.   Neurological: Negative for  "dizziness and headaches.   Psychiatric/Behavioral: Negative for depression, hallucinations and suicidal ideas. The patient is not nervous/anxious and does not have insomnia.        Exam:  /82 (BP Location: Left arm, Patient Position: Sitting)   Pulse 71   Temp 37.1 °C (98.8 °F) (Temporal)   Ht 1.49 m (4' 10.66\")   Wt 45.1 kg (99 lb 8 oz)   SpO2 100%  Body mass index is 20.33 kg/m².  Constitutional:  Not in acute distress, well appearing.  HEENT:   NC/AT  Cardiovascular: Regular rate and rhythm. No murmurs or gallops.      Lungs:   Clear to auscultation bilaterally. No wheezes or crackles. No respiratory distress.  Abdomen: Not distended, soft, not tender. No guarding or rigidity. No masses.  Extremities:  No cyanosis/clubbing/edema. No obvious deformities.  Skin:  Warm and dry.  No visible rashes.  Neurologic: Alert & oriented x 4, CN II-XII grossly intact, strength and sensation grossly intact.  No focal deficits noted.  Psychiatric:  Poor judgement, insight. Flight of ideas    Assessment/Plan:     1. Undifferentiated schizophrenia (HCC)  Patient currently being followed by outpatient psychiatry.  Patient appears to be in similar state of presentation to their document encounter.  Patient states she has not been taking her quetiapine as instructed.  -Patient not currently danger to self or others, therefore does not meet criteria for L2 K.  -We will refer patient back to [facility] behavioral health for close monitoring and follow-up.  Discussed the patient with the clinic, who agreed to reach out to patient for continued management and monitoring.  -Patient not currently reporting SI, HI. But did educate the patient,  Should she exhibit these thoughts.  Patient verbalized understanding and agreement with this plan.      2. Neuropathy  Patient currently being worked up by outpatient family medicine, most recently seen on October 18, 2021.  -We will refer patient back to outpatient family medicine for continued " management.  -Encourage patient to get her iron levels and B12 levels drawn as ordered by her family medicine clinic.  -Encourage patient to follow-up with the oncology clinic in 2 months as originally instructed.      All imaging results and lab results and consult notes are reviewed at this visit.  Followup: No follow-ups on file.    Please note that this dictation was created using voice recognition software. I have made every reasonable attempt to correct obvious errors, but I expect that there are errors of grammar and possibly content that I did not discover before finalizing the note.

## 2021-10-22 NOTE — PATIENT INSTRUCTIONS
Please keep your appointment with the family medicine department for continued Follow-Up   Please keep your appointment with Dr. Lo and the Psychiatry Department.

## 2021-11-08 ENCOUNTER — APPOINTMENT (OUTPATIENT)
Dept: BEHAVIORAL HEALTH | Facility: PSYCHIATRIC FACILITY | Age: 41
End: 2021-11-08
Payer: MEDICAID

## 2021-11-15 ENCOUNTER — APPOINTMENT (OUTPATIENT)
Dept: BEHAVIORAL HEALTH | Facility: PSYCHIATRIC FACILITY | Age: 41
End: 2021-11-15
Payer: MEDICAID